# Patient Record
Sex: FEMALE | Race: WHITE | NOT HISPANIC OR LATINO | Employment: OTHER | URBAN - METROPOLITAN AREA
[De-identification: names, ages, dates, MRNs, and addresses within clinical notes are randomized per-mention and may not be internally consistent; named-entity substitution may affect disease eponyms.]

---

## 2022-04-25 LAB — BCS RECOMMENDATION EXT: NORMAL

## 2023-05-26 LAB
CHOLEST SERPL-MSCNC: 220 MG/DL (ref 0–200)
HDLC SERPL-MCNC: 48 MG/DL (ref 35–70)
LDLC SERPL CALC-MCNC: 137 MG/DL
LIPIDS, HDL CHOLESTEROL: 48
LIPIDS, LDL CHOLESTEROL: 137
LIPIDS, TOTAL CHOLESTEROL: 220
LIPIDS, TRIGLYCERIDES: 193
TRIGL SERPL-MCNC: 193 MG/DL (ref 40–160)

## 2023-06-03 PROBLEM — E04.1 NONTOXIC SINGLE THYROID NODULE: Status: ACTIVE | Noted: 2023-06-03

## 2023-06-03 PROBLEM — E66.9 OBESE: Status: ACTIVE | Noted: 2023-06-03

## 2023-06-03 PROBLEM — D69.6 THROMBOCYTOPENIA, UNSPECIFIED: Status: ACTIVE | Noted: 2023-06-03

## 2023-06-03 PROBLEM — I25.10 CORONARY ARTERY DISEASE: Status: ACTIVE | Noted: 2023-06-03

## 2023-06-03 PROBLEM — D75.1 POLYCYTHEMIA: Status: ACTIVE | Noted: 2023-06-03

## 2023-06-03 PROBLEM — F32.A DEPRESSION: Status: ACTIVE | Noted: 2023-06-03

## 2023-06-03 PROBLEM — D64.9 ANEMIA, UNSPECIFIED: Status: ACTIVE | Noted: 2023-06-03

## 2023-06-03 PROBLEM — I77.9 CAROTID ARTERIAL DISEASE: Status: ACTIVE | Noted: 2023-06-03

## 2023-06-03 PROBLEM — M15.0 PRIMARY GENERALIZED (OSTEO)ARTHRITIS: Status: ACTIVE | Noted: 2023-06-03

## 2023-06-03 PROBLEM — Z87.19 HISTORY OF RECTAL POLYPS: Status: ACTIVE | Noted: 2023-06-03

## 2023-06-03 PROBLEM — D72.819 LEUKOPENIA: Status: ACTIVE | Noted: 2023-06-03

## 2023-06-03 PROBLEM — E04.2 MULTIPLE THYROID NODULES: Status: ACTIVE | Noted: 2023-06-03

## 2023-06-03 PROBLEM — I10 ESSENTIAL HYPERTENSION, BENIGN: Status: ACTIVE | Noted: 2023-06-03

## 2023-06-03 PROBLEM — E55.9 VITAMIN D DEFICIENCY: Status: ACTIVE | Noted: 2023-06-03

## 2023-06-03 PROBLEM — E78.5 HYPERLIPIDEMIA: Status: ACTIVE | Noted: 2023-06-03

## 2023-06-03 PROBLEM — F41.9 ANXIETY DISORDER, UNSPECIFIED: Status: ACTIVE | Noted: 2023-06-03

## 2023-06-28 ENCOUNTER — DOCUMENTATION ONLY (OUTPATIENT)
Dept: FAMILY MEDICINE | Facility: CLINIC | Age: 83
End: 2023-06-28
Payer: MEDICARE

## 2023-06-28 LAB — CRC RECOMMENDATION EXT: NORMAL

## 2023-06-28 RX ORDER — FLUOXETINE HYDROCHLORIDE 40 MG/1
40 CAPSULE ORAL DAILY
COMMUNITY
End: 2023-07-20 | Stop reason: SDUPTHER

## 2023-06-28 RX ORDER — PRAVASTATIN SODIUM 40 MG/1
40 TABLET ORAL DAILY
COMMUNITY
End: 2024-01-30 | Stop reason: SDUPTHER

## 2023-06-28 RX ORDER — NAPROXEN SODIUM 220 MG
220 TABLET ORAL 2 TIMES DAILY WITH MEALS
COMMUNITY

## 2023-06-28 RX ORDER — ASPIRIN 81 MG/1
81 TABLET ORAL DAILY
COMMUNITY

## 2023-06-28 RX ORDER — CALCIUM CARBONATE 500(1250)
1 TABLET,CHEWABLE ORAL 2 TIMES DAILY
COMMUNITY

## 2023-06-28 RX ORDER — HYDROCHLOROTHIAZIDE 12.5 MG/1
12.5 TABLET ORAL DAILY
COMMUNITY
End: 2023-07-03 | Stop reason: SDUPTHER

## 2023-06-28 RX ORDER — AMLODIPINE BESYLATE 10 MG/1
10 TABLET ORAL DAILY
COMMUNITY

## 2023-06-28 RX ORDER — CHOLECALCIFEROL (VITAMIN D3) 25 MCG
5000 TABLET ORAL DAILY
COMMUNITY

## 2023-06-28 RX ORDER — BENAZEPRIL HYDROCHLORIDE 10 MG/1
10 TABLET ORAL 2 TIMES DAILY
COMMUNITY

## 2023-07-03 ENCOUNTER — DOCUMENTATION ONLY (OUTPATIENT)
Dept: PRIMARY CARE CLINIC | Facility: CLINIC | Age: 83
End: 2023-07-03
Payer: MEDICARE

## 2023-07-03 ENCOUNTER — OFFICE VISIT (OUTPATIENT)
Dept: FAMILY MEDICINE | Facility: CLINIC | Age: 83
End: 2023-07-03
Payer: MEDICARE

## 2023-07-03 VITALS
OXYGEN SATURATION: 91 % | HEART RATE: 69 BPM | SYSTOLIC BLOOD PRESSURE: 130 MMHG | DIASTOLIC BLOOD PRESSURE: 78 MMHG | WEIGHT: 210.81 LBS | TEMPERATURE: 97 F | BODY MASS INDEX: 41.39 KG/M2 | HEIGHT: 60 IN

## 2023-07-03 DIAGNOSIS — E78.2 MIXED HYPERLIPIDEMIA: ICD-10-CM

## 2023-07-03 DIAGNOSIS — F17.200 SMOKER: ICD-10-CM

## 2023-07-03 DIAGNOSIS — Z00.00 ANNUAL PHYSICAL EXAM: ICD-10-CM

## 2023-07-03 DIAGNOSIS — D75.1 POLYCYTHEMIA: ICD-10-CM

## 2023-07-03 DIAGNOSIS — E66.9 OBESITY, UNSPECIFIED CLASSIFICATION, UNSPECIFIED OBESITY TYPE, UNSPECIFIED WHETHER SERIOUS COMORBIDITY PRESENT: ICD-10-CM

## 2023-07-03 DIAGNOSIS — R73.9 HYPERGLYCEMIA: ICD-10-CM

## 2023-07-03 DIAGNOSIS — I10 ESSENTIAL HYPERTENSION, BENIGN: Primary | ICD-10-CM

## 2023-07-03 PROCEDURE — 99214 PR OFFICE/OUTPT VISIT, EST, LEVL IV, 30-39 MIN: ICD-10-PCS | Mod: ,,, | Performed by: FAMILY MEDICINE

## 2023-07-03 PROCEDURE — 99214 OFFICE O/P EST MOD 30 MIN: CPT | Mod: ,,, | Performed by: FAMILY MEDICINE

## 2023-07-03 RX ORDER — HYDROCHLOROTHIAZIDE 12.5 MG/1
1 TABLET ORAL EVERY MORNING
COMMUNITY

## 2023-07-03 NOTE — PROGRESS NOTES
Patient Name: Tammy Arguelles     Patient ID: [id number]     Chief Complaint: Results (Patient here for lab results.)      HPI:     Tammy Arguelles is a 82 y.o. female here today for lab work results. Reviewed and discussed lab work results.    Past Medical History:   Diagnosis Date    Carotid arterial disease     Depression     Essential hypertension, benign     History of rectal polyps     Hyperlipidemia     Hyperuricemia     Nontoxic single thyroid nodule     Obese     Polycythemia     Primary generalized (osteo)arthritis     Vitamin D deficiency         Past Surgical History:   Procedure Laterality Date    ANGIOGRAM, CORONARY, WITH LEFT HEART CATHETERIZATION      CHOLECYSTECTOMY      HYSTERECTOMY      PARTIAL KNEE REPLACE Bilateral     R  ELBOW SURGERY      ROTATOR CUFF REPAIR Right     THYROID NODULE BIOP      THYROID SURGERY Left 2023    Nodule removed        Social History     Socioeconomic History    Marital status:     Number of children: 3   Tobacco Use    Smoking status: Every Day     Packs/day: 0.25     Years: 15.00     Pack years: 3.75     Types: Cigarettes    Smokeless tobacco: Never   Substance and Sexual Activity    Alcohol use: Yes     Comment: very seldom    Drug use: Never        Current Outpatient Medications   Medication Instructions    amLODIPine (NORVASC) 10 mg, Oral, Daily    aspirin (ECOTRIN) 81 mg, Oral, Daily    benazepriL (LOTENSIN) 10 mg, Oral, 2 times daily    calcium carbonate (OS-DARRIN) 500 mg calcium (1,250 mg) chewable tablet 1 tablet, Oral, 2 times daily    FLUoxetine 40 mg, Oral, Daily    hydroCHLOROthiazide (HYDRODIURIL) 12.5 MG Tab 1 tablet, Every morning    naproxen sodium (ANAPROX) 220 mg, Oral, 2 times daily with meals    pravastatin (PRAVACHOL) 40 mg, Oral, Daily    vitamin D (VITAMIN D3) 5,000 Units, Oral, Daily       Review of patient's allergies indicates:  No Known Allergies       There is no immunization history on file for this patient.    Patient Care  Team:  Sebastien Rothman Sr., MD as PCP - General (Family Medicine)  Son DEBRA MD Dori as Consulting Physician (Gastroenterology)  Vernon A. Valentino, MD as Consulting Physician (Cardiology)  Olu Kuhn MD as Consulting Physician (Otolaryngology)     Subjective:     Review of Systems    10 point review of systems conducted, negative except as stated in the history of present illness. See HPI for details.    Objective:     Visit Vitals  /78 (BP Location: Left arm, Patient Position: Sitting, BP Method: Large (Manual))   Pulse 69   Temp 97.2 °F (36.2 °C)   Ht 5' (1.524 m)   Wt 95.6 kg (210 lb 12.8 oz)   SpO2 (!) 91%   BMI 41.17 kg/m²       Physical Exam  Constitutional:       Appearance: She is obese.   HENT:      Head: Normocephalic and atraumatic.   Cardiovascular:      Rate and Rhythm: Normal rate and regular rhythm.      Heart sounds: Normal heart sounds.   Pulmonary:      Effort: Pulmonary effort is normal.      Breath sounds: Normal breath sounds.   Abdominal:      General: Abdomen is flat.      Palpations: Abdomen is soft.      Tenderness: There is no abdominal tenderness.   Musculoskeletal:         General: No swelling or tenderness. Normal range of motion.      Cervical back: Normal range of motion and neck supple.      Right lower leg: No edema.      Left lower leg: No edema.   Skin:     General: Skin is warm and dry.   Neurological:      General: No focal deficit present.      Mental Status: She is alert and oriented to person, place, and time.   Psychiatric:         Mood and Affect: Mood normal.         Assessment:       ICD-10-CM ICD-9-CM   1. Essential hypertension, benign  I10 401.1   2. Mixed hyperlipidemia  E78.2 272.2   3. Polycythemia  D75.1 238.4   4. Obesity, unspecified classification, unspecified obesity type, unspecified whether serious comorbidity present  E66.9 278.00   5. Smoker  F17.200 305.1   6. Hyperglycemia  R73.9 790.29   7. Annual physical exam  Z00.00 V70.0         Plan:     1. Essential hypertension, benign  Overview:  Continue with Norvasc 10 mg daily + Lotensin 10 mg daily HCTZ 12.5 mg daily.  Patient is well controlled.  Low Sodium Diet (DASH Diet - Less than 2 grams of sodium per day).  Monitor blood pressure daily and log. Report consistent numbers greater than 140/90.  Maintain healthy weight with goal BMI <30. Exercise 30 minutes per day, 5 days per week.      Orders:  -     CBC Auto Differential; Future; Expected date: 11/03/2023  -     POCT Microalbumin/Creatinine Ratio; Future; Expected date: 11/03/2023    2. Mixed hyperlipidemia  Overview:  Most recent  mg/dL on 5/26/23.  Continue with Pravachol 40 mg daily.  Stressed importance of dietary modifications. Follow a low cholesterol, low saturated fat diet with less that 200mg of cholesterol a day.  Avoid fried foods and high saturated fats (high saturated fats less than 7% of calories).  Add Flax Seed/Fish Oil supplements to diet. Increase dietary fiber.  Regular exercise can reduce LDL and raise HDL. Stressed importance of physical activity 5 times per week for 30 minutes per day.       Assessment & Plan:  Instructed patient to take Pravachol 40 mg every day as prescribed.  Educated on low cholesterol diet.      3. Polycythemia  Overview:  Continue with Aspirin 81 mg daily.  Polycythemia possibly secondary to smoking.  Increase daily water intake.  Will continue to monitor        4. Obesity, unspecified classification, unspecified obesity type, unspecified whether serious comorbidity present  Overview:  Body mass index is 41.17 kg/m².  Goal BMI <30.  Exercise 5 times a week for 30 minutes per day.  Avoid soda, simple sugars, excessive rice, potatoes or bread. Limit fast foods and fried foods.  Choose complex carbs in moderation (example: green vegetables, beans, oatmeal). Eat plenty of fresh fruits and vegetables with lean meats daily.  Do not skip meals. Eat a balanced portion size.  Avoid fad diets.  Consider permanent healthy life style changes.       5. Smoker  Overview:  Patient was offered smoking cessation techniques such as nicotine gum or patches.  They were also offered a more regimented smoking cessation program.  They were advised to decrease the number of cigarettes by 1 every few days until they completely stopped.  It was strongly recommended that they set a quit date and stick to it.  And finally, important health reasons to stop smoking were given to the patient. Approximately    minutes was spent discussing smoking cessation.      6. Hyperglycemia  Overview:  Follow ADA Diet. Avoid soda, simple sweets, and limit rice/pasta/breads/starches (no more than 45-50 grams per meal).  Maintain healthy weight with goal BMI <30.  Exercise 5 times per week for 30 minutes per day.    Orders:  -     Hemoglobin A1C; Future; Expected date: 07/10/2023    7. Annual physical exam  -     CBC Auto Differential; Future; Expected date: 11/03/2023  -     Comprehensive Metabolic Panel; Future; Expected date: 11/03/2023  -     Lipid Panel; Future; Expected date: 11/03/2023  -     TSH; Future; Expected date: 11/03/2023  -     CK; Future; Expected date: 11/03/2023  -     POCT Urinalysis, Dipstick, Automated, W/O Scope; Future; Expected date: 11/03/2023  -     POCT Microalbumin/Creatinine Ratio; Future; Expected date: 11/03/2023  -     EKG 12-lead; Future; Expected date: 11/03/2023        [x] Discussed lab findings with the patient.  [x] Discussed diet, exercise and if appropriate, weight loss.  [x] Instructions and information, including risks and benefits of prescribed medication(s) have been reviewed with the patient and patient verbalizes understanding. Questions have been answered to the patient's satisfaction.  [] Appropriate counseling has been given regarding anxiety and depressive issues that were discussed today.  [] Any lab drawn today will be reviewed by physician at the time it is received and appropriate  recommendations bill be made and discussed with patient.     Follow up in about 4 months (around 11/3/2023) for Annual Wellness, With Fasting Labs prior to visit.   In addition to their scheduled follow up, the patient has also been instructed to follow up on as needed basis.     Future Appointments   Date Time Provider Department Center   7/10/2023  8:20 AM NURSE, [facility] STANISLAV Rothman Sr, MD

## 2023-07-03 NOTE — ASSESSMENT & PLAN NOTE
Instructed patient to take Pravachol 40 mg every day as prescribed.  Educated on low cholesterol diet.

## 2023-07-07 ENCOUNTER — DOCUMENTATION ONLY (OUTPATIENT)
Dept: FAMILY MEDICINE | Facility: CLINIC | Age: 83
End: 2023-07-07
Payer: MEDICARE

## 2023-07-07 NOTE — PROGRESS NOTES
Pt instructed to RTC for Lab draw. Verbal orders per MD.   She will call to confirm availability with front office staff.

## 2023-07-12 PROCEDURE — 83036 HEMOGLOBIN GLYCOSYLATED A1C: CPT | Performed by: OTOLARYNGOLOGY

## 2023-07-12 PROCEDURE — 84443 ASSAY THYROID STIM HORMONE: CPT | Performed by: OTOLARYNGOLOGY

## 2023-07-20 ENCOUNTER — DOCUMENTATION ONLY (OUTPATIENT)
Dept: FAMILY MEDICINE | Facility: CLINIC | Age: 83
End: 2023-07-20
Payer: MEDICARE

## 2023-07-20 DIAGNOSIS — F32.A DEPRESSION, UNSPECIFIED DEPRESSION TYPE: Primary | ICD-10-CM

## 2023-07-20 RX ORDER — FLUOXETINE HYDROCHLORIDE 40 MG/1
40 CAPSULE ORAL DAILY
Qty: 90 CAPSULE | Refills: 0 | Status: SHIPPED | OUTPATIENT
Start: 2023-07-20 | End: 2023-11-28 | Stop reason: SDUPTHER

## 2023-11-28 DIAGNOSIS — F32.A DEPRESSION, UNSPECIFIED DEPRESSION TYPE: ICD-10-CM

## 2023-11-28 RX ORDER — FLUOXETINE HYDROCHLORIDE 40 MG/1
40 CAPSULE ORAL DAILY
Qty: 90 CAPSULE | Refills: 1 | Status: SHIPPED | OUTPATIENT
Start: 2023-11-28

## 2024-01-18 LAB
CHOLEST SERPL-MSCNC: 194 MG/DL (ref 0–200)
HBA1C MFR BLD: 5.8 % (ref 4–6)
HDLC SERPL-MCNC: 54 MG/DL (ref 35–70)
LDLC SERPL CALC-MCNC: 116 MG/DL
TRIGL SERPL-MCNC: 118 MG/DL (ref 40–160)

## 2024-01-30 DIAGNOSIS — E78.2 MIXED HYPERLIPIDEMIA: Primary | ICD-10-CM

## 2024-01-30 DIAGNOSIS — E78.2 MIXED HYPERLIPIDEMIA: ICD-10-CM

## 2024-01-30 RX ORDER — PRAVASTATIN SODIUM 40 MG/1
40 TABLET ORAL NIGHTLY
Qty: 90 TABLET | Refills: 0 | Status: SHIPPED | OUTPATIENT
Start: 2024-01-30 | End: 2024-01-30 | Stop reason: SDUPTHER

## 2024-01-30 RX ORDER — PRAVASTATIN SODIUM 40 MG/1
40 TABLET ORAL NIGHTLY
Qty: 90 TABLET | Refills: 0 | Status: SHIPPED | OUTPATIENT
Start: 2024-01-30 | End: 2024-02-09

## 2024-02-08 PROBLEM — I77.9 CAROTID ARTERIAL DISEASE: Status: RESOLVED | Noted: 2023-06-03 | Resolved: 2024-02-08

## 2024-02-08 PROBLEM — Z87.19 HISTORY OF RECTAL POLYPS: Status: RESOLVED | Noted: 2023-06-03 | Resolved: 2024-02-08

## 2024-02-08 NOTE — ASSESSMENT & PLAN NOTE
Total Cholestrol 194 mg/dl ;[id number] mg/dl; HDL 54mg/dl;  mg/dl.  Patient's cholesterol is not well controlled.  We will DC Pravachol and start on Crestor 20 mg daily.  We will continue to monitor.

## 2024-02-09 ENCOUNTER — OFFICE VISIT (OUTPATIENT)
Dept: FAMILY MEDICINE | Facility: CLINIC | Age: 84
End: 2024-02-09
Payer: MEDICARE

## 2024-02-09 VITALS — BODY MASS INDEX: 41.43 KG/M2 | HEIGHT: 60 IN | WEIGHT: 211 LBS

## 2024-02-09 DIAGNOSIS — G47.8 NON-RESTORATIVE SLEEP: ICD-10-CM

## 2024-02-09 DIAGNOSIS — R06.83 SNORING: ICD-10-CM

## 2024-02-09 DIAGNOSIS — I10 ESSENTIAL HYPERTENSION, BENIGN: Primary | ICD-10-CM

## 2024-02-09 DIAGNOSIS — Z12.31 SCREENING MAMMOGRAM FOR BREAST CANCER: ICD-10-CM

## 2024-02-09 DIAGNOSIS — F17.200 SMOKER: ICD-10-CM

## 2024-02-09 DIAGNOSIS — R73.03 PREDIABETES: ICD-10-CM

## 2024-02-09 DIAGNOSIS — E78.2 MIXED HYPERLIPIDEMIA: ICD-10-CM

## 2024-02-09 DIAGNOSIS — E66.01 CLASS 3 SEVERE OBESITY DUE TO EXCESS CALORIES WITH SERIOUS COMORBIDITY AND BODY MASS INDEX (BMI) OF 40.0 TO 44.9 IN ADULT: ICD-10-CM

## 2024-02-09 DIAGNOSIS — I38 VALVULAR HEART DISEASE: ICD-10-CM

## 2024-02-09 PROBLEM — E66.813 CLASS 3 SEVERE OBESITY DUE TO EXCESS CALORIES WITH BODY MASS INDEX (BMI) OF 40.0 TO 44.9 IN ADULT: Status: ACTIVE | Noted: 2023-06-03

## 2024-02-09 PROCEDURE — 99214 OFFICE O/P EST MOD 30 MIN: CPT | Mod: ,,, | Performed by: FAMILY MEDICINE

## 2024-02-09 RX ORDER — ROSUVASTATIN CALCIUM 20 MG/1
20 TABLET, COATED ORAL DAILY
Qty: 90 TABLET | Refills: 3 | Status: SHIPPED | OUTPATIENT
Start: 2024-02-09 | End: 2025-02-08

## 2024-02-09 NOTE — PROGRESS NOTES
Patient Name: Tammy Arguelles     Patient ID: [id number]     Chief Complaint: Results (Here for lab results.)      HPI:     Tammy Arguelles is a 83 y.o. female here today for follow-up of hypertension, hyperlipidemia and prediabetes.    Past Medical History:   Diagnosis Date    Aortic stenosis, severe 02/06/2024    Dr Valentino    Carotid arterial disease     Depression     Essential hypertension, benign     History of rectal polyps     Hyperlipidemia     Hyperuricemia     Nontoxic single thyroid nodule     Obese     Polycythemia     Primary generalized (osteo)arthritis     Vitamin D deficiency         Past Surgical History:   Procedure Laterality Date    ANGIOGRAM, CORONARY, WITH LEFT HEART CATHETERIZATION      CHOLECYSTECTOMY      COLONOSCOPY  06/28/2023    No further screenings    HYSTERECTOMY      PARTIAL KNEE REPLACE Bilateral     R  ELBOW SURGERY      ROTATOR CUFF REPAIR Right     THYROID NODULE BIOP      THYROID SURGERY Left 2023    Nodule removed        Social History     Socioeconomic History    Marital status:     Number of children: 3   Tobacco Use    Smoking status: Every Day     Current packs/day: 0.25     Average packs/day: 0.3 packs/day for 15.0 years (3.8 ttl pk-yrs)     Types: Cigarettes    Smokeless tobacco: Never   Substance and Sexual Activity    Alcohol use: Yes     Comment: very seldom    Drug use: Never        Current Outpatient Medications   Medication Instructions    amLODIPine (NORVASC) 10 mg, Oral, Daily    aspirin (ECOTRIN) 81 mg, Oral, Daily    benazepriL (LOTENSIN) 10 mg, Oral, 2 times daily    calcium carbonate (OS-DARRIN) 500 mg calcium (1,250 mg) chewable tablet 1 tablet, Oral, 2 times daily    FLUoxetine 40 mg, Oral, Daily    hydroCHLOROthiazide (HYDRODIURIL) 12.5 MG Tab 1 tablet, Every morning    naproxen sodium (ANAPROX) 220 mg, Oral, 2 times daily with meals    vitamin D (VITAMIN D3) 5,000 Units, Oral, Daily       Review of patient's allergies indicates:   Allergen  Reactions    Adhesive         Immunization History   Administered Date(s) Administered    COVID-19, MRNA, LN-S, PF (Pfizer) (Purple Cap) 08/26/2021, 09/16/2021    Zoster Recombinant 03/15/2022, 05/31/2022       Patient Care Team:  Sebastien Rothman Sr., MD as PCP - General (Family Medicine)  Jesus Meadows MD as Consulting Physician (Gastroenterology)  Valentino, Vernon A., MD as Consulting Physician (Cardiology)  Olu Kuhn MD as Consulting Physician (Otolaryngology)     Subjective:     Review of Systems    10 point review of systems conducted, negative except as stated in the history of present illness. See HPI for details.    Objective:     Visit Vitals  Ht 5' (1.524 m)   Wt 95.7 kg (211 lb)   BMI 41.21 kg/m²       Physical Exam  Constitutional:       Appearance: She is obese.   HENT:      Head: Normocephalic and atraumatic.   Cardiovascular:      Rate and Rhythm: Normal rate and regular rhythm.   Pulmonary:      Effort: Pulmonary effort is normal.      Breath sounds: Normal breath sounds.   Abdominal:      Palpations: Abdomen is soft.      Tenderness: There is no abdominal tenderness.   Musculoskeletal:         General: No swelling or tenderness. Normal range of motion.      Cervical back: Normal range of motion and neck supple.      Right lower leg: No edema.      Left lower leg: No edema.   Skin:     General: Skin is warm and dry.   Neurological:      General: No focal deficit present.      Mental Status: She is alert and oriented to person, place, and time.   Psychiatric:         Mood and Affect: Mood normal.       Assessment:       ICD-10-CM ICD-9-CM   1. Essential hypertension, benign  I10 401.1   2. Mixed hyperlipidemia  E78.2 272.2   3. Prediabetes  R73.03 790.29   4. Valvular heart disease  I38 424.90   5. Class 3 severe obesity due to excess calories with serious comorbidity and body mass index (BMI) of 40.0 to 44.9 in adult  E66.01 278.01    Z68.41 V85.41   6. Smoker  F17.200 305.1        Plan:   1. Essential hypertension, benign  Overview:  Hypertension Medications               amLODIPine (NORVASC) 10 MG tablet Take 10 mg by mouth once daily.    benazepriL (LOTENSIN) 10 MG tablet Take 10 mg by mouth 2 (two) times a day.    hydroCHLOROthiazide (HYDRODIURIL) 12.5 MG Tab 1 tablet once daily.        Low Sodium Diet (DASH Diet - Less than 2 grams of sodium per day).  Monitor blood pressure daily and log. Report consistent numbers greater than 140/90.  Maintain healthy weight with goal BMI <30. Exercise 30 minutes per day, 5 days per week.      Assessment & Plan:  Blood Pressure at goal and well controlled . Pt. to continue on current medications and management of hypertension with Dr. Valentino.      2. Mixed hyperlipidemia  Overview:  Hyperlipidemia Medications               pravastatin (PRAVACHOL) 40 MG tablet Take 1 tablet (40 mg total) by mouth every evening.        Stressed importance of dietary modifications. Follow a low cholesterol, low saturated fat diet with less that 200mg of cholesterol a day.  Avoid fried foods and high saturated fats (high saturated fats less than 7% of calories).  Add Flax Seed/Fish Oil supplements to diet. Increase dietary fiber.  Regular exercise can reduce LDL and raise HDL. Stressed importance of physical activity 5 times per week for 30 minutes per day.       Assessment & Plan:  Total Cholestrol 194 mg/dl ;Fozejixxdhsgi537 mg/dl; HDL 54mg/dl;  mg/dl.  Patient's cholesterol is not well controlled.  We will DC Pravachol and start on Crestor 20 mg daily.  We will continue to monitor.      3. Prediabetes  Overview:  Patient is on no meds for her prediabetes   Follow ADA Diet. Avoid soda, simple sweets, and limit rice/pasta/breads/starches (no more than 45-50 grams per meal).  Maintain healthy weight with goal BMI <30.  Exercise 5 times per week for 30 minutes per day.    Assessment & Plan:  Patient's A1c on 01/18/2024 was 5.8.  Her prediabetes is well  controlled with diet. We will continue to monitor her A1c levels.      4. Valvular heart disease  Overview:  Patient has aortic and mitral valve stenosis.  She is being seen and managed by Cardiology.    Assessment & Plan:  Continued cardiology follow-up was recommended.      5. Class 3 severe obesity due to excess calories with serious comorbidity and body mass index (BMI) of 40.0 to 44.9 in adult  Overview:  Body mass index is 41.17 kg/m².  Goal BMI <30.  Exercise 5 times a week for 30 minutes per day.  Avoid soda, simple sugars, excessive rice, potatoes or bread. Limit fast foods and fried foods.  Choose complex carbs in moderation (example: green vegetables, beans, oatmeal). Eat plenty of fresh fruits and vegetables with lean meats daily.  Do not skip meals. Eat a balanced portion size.  Avoid fad diets. Consider permanent healthy life style changes.     Assessment & Plan:  Patient is obese, she snores, has daytime fatigue and non restorative sleep.  We will set up for a sleep study.      6. Smoker  Comments:  We will send patient for a CT lung cancer screen.  Overview:  Patient was offered smoking cessation techniques such as nicotine gum or patches.  They were also offered a more regimented smoking cessation program.  They were advised to decrease the number of cigarettes by 1 every few days until they completely stopped.  It was strongly recommended that they set a quit date and stick to it.  And finally, important health reasons to stop smoking were given to the patient. Approximately 10 minutes was spent discussing smoking cessation.    Assessment & Plan:  Pt is down to 3 cigarettes/ day.            [x] Discussed lab findings with the patient.  [x] Discussed diet, exercise and if appropriate, weight loss.  [x] Instructions and information, including risks and benefits of prescribed medication(s) have been reviewed with the patient and patient verbalizes understanding. Questions have been answered to the  patient's satisfaction.  [] Appropriate counseling has been given regarding anxiety and depressive issues that were discussed today.  [] Any lab drawn today will be reviewed by physician at the time it is received and appropriate recommendations bill be made and discussed with patient.     No follow-ups on file.   In addition to their scheduled follow up, the patient has also been instructed to follow up on as needed basis.     Future Appointments   Date Time Provider Department Center   5/7/2024  7[facility]   5/21/2024  8:30 AM Sebastien Rothman Sr., MD [facility] Rothman Sr, MD

## 2024-02-09 NOTE — ASSESSMENT & PLAN NOTE
Blood Pressure at goal and well controlled . Pt. to continue on current medications and management of hypertension with Dr. Valentino.

## 2024-02-09 NOTE — ASSESSMENT & PLAN NOTE
Patient is obese, she snores, has daytime fatigue and non restorative sleep.  We will set up for a sleep study.

## 2024-02-09 NOTE — ASSESSMENT & PLAN NOTE
Patient's A1c on 01/18/2024 was 5.8.  Her prediabetes is well controlled with diet. We will continue to monitor her A1c levels.

## 2024-02-12 ENCOUNTER — DOCUMENTATION ONLY (OUTPATIENT)
Dept: FAMILY MEDICINE | Facility: CLINIC | Age: 84
End: 2024-02-12
Payer: MEDICARE

## 2024-02-21 ENCOUNTER — TELEPHONE (OUTPATIENT)
Dept: FAMILY MEDICINE | Facility: CLINIC | Age: 84
End: 2024-02-21
Payer: MEDICARE

## 2024-02-22 NOTE — TELEPHONE ENCOUNTER
Ms. Munoz called to let you know the surgeon does not think she is a surgical candidate. Dr. Valentino will treat her with diuretics.

## 2024-02-22 NOTE — TELEPHONE ENCOUNTER
----- Message from Clari Jones sent at 2/21/2024  8:25 AM CST -----  Regarding: AYAN SUTHERLAND SAW THE SURGEON AND SHE WANTS TO LET YOU KNOW WHAT HE SAID

## 2024-05-07 PROCEDURE — 83036 HEMOGLOBIN GLYCOSYLATED A1C: CPT | Performed by: FAMILY MEDICINE

## 2024-05-07 PROCEDURE — 82306 VITAMIN D 25 HYDROXY: CPT | Performed by: FAMILY MEDICINE

## 2024-05-07 PROCEDURE — 80061 LIPID PANEL: CPT | Performed by: FAMILY MEDICINE

## 2024-05-07 PROCEDURE — 84443 ASSAY THYROID STIM HORMONE: CPT | Performed by: FAMILY MEDICINE

## 2024-05-07 PROCEDURE — 84550 ASSAY OF BLOOD/URIC ACID: CPT | Performed by: FAMILY MEDICINE

## 2024-05-07 PROCEDURE — 80053 COMPREHEN METABOLIC PANEL: CPT | Performed by: FAMILY MEDICINE

## 2024-05-07 PROCEDURE — 85025 COMPLETE CBC W/AUTO DIFF WBC: CPT | Performed by: FAMILY MEDICINE

## 2024-05-21 ENCOUNTER — OFFICE VISIT (OUTPATIENT)
Dept: FAMILY MEDICINE | Facility: CLINIC | Age: 84
End: 2024-05-21
Payer: MEDICARE

## 2024-05-21 VITALS
BODY MASS INDEX: 41.35 KG/M2 | DIASTOLIC BLOOD PRESSURE: 78 MMHG | HEART RATE: 90 BPM | WEIGHT: 210.63 LBS | TEMPERATURE: 97 F | HEIGHT: 60 IN | OXYGEN SATURATION: 92 % | SYSTOLIC BLOOD PRESSURE: 128 MMHG | RESPIRATION RATE: 22 BRPM

## 2024-05-21 DIAGNOSIS — I38 VALVULAR HEART DISEASE: Primary | ICD-10-CM

## 2024-05-21 DIAGNOSIS — I10 ESSENTIAL HYPERTENSION, BENIGN: ICD-10-CM

## 2024-05-21 DIAGNOSIS — E78.2 MIXED HYPERLIPIDEMIA: ICD-10-CM

## 2024-05-21 PROCEDURE — 99214 OFFICE O/P EST MOD 30 MIN: CPT | Mod: ,,, | Performed by: NURSE PRACTITIONER

## 2024-05-21 RX ORDER — FUROSEMIDE 20 MG/1
20 TABLET ORAL DAILY
COMMUNITY
Start: 2024-02-21

## 2024-05-21 NOTE — PROGRESS NOTES
Internal Medicine    Patient ID: [id number]     Chief Complaint: Results ((Patient here for lab results).)    HPI:     Tammy Arguelles is a 83 y.o. female here today for a follow up. Reviewed and discussed lab results. Saw Cardiology recently and was not deemed a surgical candidate for aortic valve replacement. Reports some decline - having mild CHAMBERLAIN, edema and SOB.  We discussed at length the importance of an advanced directive and appointing medical POA. Her son was a medic and she feels he may not honor her wishes of DNR. We discussed home health vs hospice if she continues to decline - wishes to hold off for now. No other complaints today.     Past Medical History:   Diagnosis Date    Aortic stenosis, severe 02/06/2024    Dr Valentino    Carotid arterial disease     Depression     Essential hypertension, benign     History of rectal polyps     Hyperlipidemia     Hyperuricemia     Nontoxic single thyroid nodule     Obese     Polycythemia     Primary generalized (osteo)arthritis     Vitamin D deficiency         Past Surgical History:   Procedure Laterality Date    ANGIOGRAM, CORONARY, WITH LEFT HEART CATHETERIZATION      CHOLECYSTECTOMY      COLONOSCOPY  06/28/2023    No further screenings    HYSTERECTOMY      PARTIAL KNEE REPLACE Bilateral     R  ELBOW SURGERY      ROTATOR CUFF REPAIR Right     THYROID NODULE BIOP      THYROID SURGERY Left 2023    Nodule removed        Social History     Tobacco Use    Smoking status: Every Day     Current packs/day: 0.25     Average packs/day: 0.3 packs/day for 15.0 years (3.8 ttl pk-yrs)     Types: Cigarettes    Smokeless tobacco: Never   Substance and Sexual Activity    Alcohol use: Yes     Comment: very seldom    Drug use: Never    Sexual activity: Not on file        Current Outpatient Medications   Medication Instructions    amLODIPine (NORVASC) 10 mg, Oral, Daily    aspirin (ECOTRIN) 81 mg, Oral, Daily    benazepriL (LOTENSIN) 10 mg, Oral, 2 times daily    calcium carbonate  (OS-DARRIN) 500 mg calcium (1,250 mg) chewable tablet 1 tablet, Oral, 2 times daily    FLUoxetine 40 mg, Oral, Daily    furosemide (LASIX) 20 mg, Oral, Daily    naproxen sodium (ANAPROX) 220 mg, Oral, 2 times daily with meals    rosuvastatin (CRESTOR) 20 mg, Oral, Daily    vitamin D (VITAMIN D3) 5,000 Units, Oral, Daily       Review of patient's allergies indicates:   Allergen Reactions    Adhesive         Patient Care Team:  Sebastien Rothman Sr., MD as PCP - General (Family Medicine)  Jesus Meadows MD as Consulting Physician (Gastroenterology)  Valentino, Vernon A., MD as Consulting Physician (Cardiology)  Olu Kuhn MD as Consulting Physician (Otolaryngology)     Subjective:     Review of Systems    12 point review of systems conducted, negative except as stated in the history of present illness. See HPI for details.    Objective:     Visit Vitals  /78   Pulse 90   Temp 97.4 °F (36.3 °C)   Resp (!) 22   Ht 5' (1.524 m)   Wt 95.5 kg (210 lb 9.6 oz)   SpO2 (!) 92%   BMI 41.13 kg/m²       Physical Exam  Vitals and nursing note reviewed.   Constitutional:       General: She is not in acute distress.     Appearance: She is obese. She is not ill-appearing.   HENT:      Head: Normocephalic and atraumatic.      Mouth/Throat:      Mouth: Mucous membranes are moist.      Pharynx: Oropharynx is clear.   Eyes:      General: No scleral icterus.     Extraocular Movements: Extraocular movements intact.      Conjunctiva/sclera: Conjunctivae normal.      Pupils: Pupils are equal, round, and reactive to light.   Neck:      Vascular: No carotid bruit.   Cardiovascular:      Rate and Rhythm: Normal rate and regular rhythm.      Heart sounds: Murmur heard.      No friction rub. No gallop.   Pulmonary:      Effort: Pulmonary effort is normal. No respiratory distress.      Breath sounds: Normal breath sounds. No wheezing, rhonchi or rales.   Abdominal:      General: Abdomen is flat. Bowel sounds are normal. There is  no distension.      Palpations: Abdomen is soft. There is no mass.      Tenderness: There is no abdominal tenderness.   Musculoskeletal:         General: Normal range of motion.      Cervical back: Normal range of motion and neck supple.      Right lower leg: Edema present.      Left lower leg: Edema present.   Skin:     General: Skin is warm and dry.   Neurological:      General: No focal deficit present.      Mental Status: She is alert.   Psychiatric:         Mood and Affect: Mood normal.         Labs Reviewed:     Chemistry:  Lab Results   Component Value Date     05/07/2024    K 4.0 05/07/2024    CHLORIDE 106 05/07/2024    BUN 15.3 05/07/2024    CREATININE 0.78 05/07/2024    EGFRNORACEVR >60 05/07/2024    GLUCOSE 95 05/07/2024    CALCIUM 9.6 05/07/2024    ALKPHOS 69 05/07/2024    LABPROT 6.9 05/07/2024    ALBUMIN 3.7 05/07/2024    BILIDIR 0.3 01/30/2021    IBILI 0.10 01/30/2021    AST 15 05/07/2024    ALT 10 05/07/2024    MG 2.30 01/30/2021    [id number] 67.0 05/07/2024    TSH 2.924 05/07/2024        Lab Results   Component Value Date    HGBA1C 5.6 05/07/2024        Hematology:  Lab Results   Component Value Date    WBC 8.03 05/07/2024    HGB 15.4 05/07/2024    HCT 49.3 (H) 05/07/2024     05/07/2024       Lipid Panel:  Lab Results   Component Value Date    CHOL 129 05/07/2024    HDL 52 05/07/2024    LDL 57.00 05/07/2024    TRIG 99 05/07/2024    TOTALCHOLEST 2 05/07/2024    LDLCALC 92 01/30/2024        Urine:  Lab Results   Component Value Date    APPEARANCEUA TURBID 01/25/2021    PROTEINUA 2+ (A) 01/25/2021    LEUKOCYTESUR Trace (A) 01/25/2021    RBCUA None Seen 01/25/2021    WBCUA 6 (H) 01/25/2021    BACTERIA 1+ (A) 01/25/2021    HYALINECASTS Occasional (A) 01/25/2021        Assessment:       ICD-10-CM ICD-9-CM   1. Valvular heart disease  I38 424.90   2. Essential hypertension, benign  I10 401.1   3. Mixed hyperlipidemia  E78.2 272.2        Plan:     1. Valvular heart  disease  Overview:  Patient has aortic and mitral valve stenosis.  She is being seen and managed by Cardiology - Dr. Valentino. Deemed nonsurgical candidate - by Dr. Wray.      2. Essential hypertension, benign  Overview:  Hypertension Medications               amLODIPine (NORVASC) 10 MG tablet Take 10 mg by mouth once daily.    benazepriL (LOTENSIN) 10 MG tablet Take 10 mg by mouth 2 (two) times a day.    hydroCHLOROthiazide (HYDRODIURIL) 12.5 MG Tab 1 tablet once daily.        Low Sodium Diet (DASH Diet - Less than 2 grams of sodium per day).  Monitor blood pressure daily and log. Report consistent numbers greater than 140/90.  Maintain healthy weight with goal BMI <30. Exercise 30 minutes per day, 5 days per week.        3. Mixed hyperlipidemia  Overview:  Hyperlipidemia Medications               pravastatin (PRAVACHOL) 40 MG tablet Take 1 tablet (40 mg total) by mouth every evening.        Stressed importance of dietary modifications. Follow a low cholesterol, low saturated fat diet with less that 200mg of cholesterol a day.  Avoid fried foods and high saturated fats (high saturated fats less than 7% of calories).  Add Flax Seed/Fish Oil supplements to diet. Increase dietary fiber.  Regular exercise can reduce LDL and raise HDL. Stressed importance of physical activity 5 times per week for 30 minutes per day.              Follow up in about 2 months (around 7/21/2024) for Routine Follow Up. In addition to their scheduled follow up, the patient has also been instructed to follow up on as needed basis.     Future Appointments   Date Time Provider Department Center   7/22/2024 11:00 AM Sebastien Rothman Sr., MD [facility]viere, FNP

## 2024-06-06 DIAGNOSIS — F32.A DEPRESSION, UNSPECIFIED DEPRESSION TYPE: ICD-10-CM

## 2024-06-06 RX ORDER — FLUOXETINE HYDROCHLORIDE 40 MG/1
40 CAPSULE ORAL DAILY
Qty: 90 CAPSULE | Refills: 1 | Status: SHIPPED | OUTPATIENT
Start: 2024-06-06

## 2024-06-07 ENCOUNTER — TELEPHONE (OUTPATIENT)
Dept: FAMILY MEDICINE | Facility: CLINIC | Age: 84
End: 2024-06-07
Payer: MEDICARE

## 2024-06-07 DIAGNOSIS — I10 ESSENTIAL HYPERTENSION, BENIGN: Primary | ICD-10-CM

## 2024-06-07 RX ORDER — BENAZEPRIL HYDROCHLORIDE 10 MG/1
10 TABLET ORAL 2 TIMES DAILY
Qty: 60 TABLET | Refills: 2 | Status: SHIPPED | OUTPATIENT
Start: 2024-06-07

## 2024-06-07 NOTE — TELEPHONE ENCOUNTER
----- Message from Venus Johnson sent at 6/7/2024  8:47 AM CDT -----  Needs Benazepril refilled for Brianna.

## 2024-07-15 DIAGNOSIS — I10 ESSENTIAL HYPERTENSION, BENIGN: ICD-10-CM

## 2024-07-15 RX ORDER — BENAZEPRIL HYDROCHLORIDE 10 MG/1
10 TABLET ORAL 2 TIMES DAILY
Qty: 180 TABLET | Refills: 1 | Status: SHIPPED | OUTPATIENT
Start: 2024-07-15

## 2024-07-18 NOTE — ASSESSMENT & PLAN NOTE
Discussed prognosis and progressive nature of the disease  Discussed DNR and advanced planning today  -patient has all the forms at home but has not filled them out yet  -patient wants to be DNR, but her son, an EMT, disagrees with her; she will have a talk with him when the time is right.  We discussed that this was her choice, not his  -she wants her son to make decisions for her should she become incapacitated, but again she has not filled out a healthcare power-of- (or an advance directive) yet; she believes he would try to do more for her then she would want so she will have a conversation with him to make sure they are on the same page before she signs the paperwork.  As it stands today, she does not want any artificial life saving or sustaining measures, and she does not want CPR.  She wants to be allowed to go peacefully when it is time.

## 2024-07-18 NOTE — PROGRESS NOTES
Family Medicine    Patient ID: [id number]     Chief Complaint: Follow-up (Patient here for a 2 month follow up visit.)      HPI:     Tammy Arguelles is a 83 y.o. female here today for a follow up.     Past Medical History:   Diagnosis Date    Aortic stenosis, severe 02/06/2024    Dr Valentino    Carotid arterial disease     Depression     Essential hypertension, benign     History of rectal polyps     Hyperlipidemia     Hyperuricemia     Nontoxic single thyroid nodule     Obese     Polycythemia     Primary generalized (osteo)arthritis     Vitamin D deficiency         Past Surgical History:   Procedure Laterality Date    ANGIOGRAM, CORONARY, WITH LEFT HEART CATHETERIZATION      CHOLECYSTECTOMY      COLONOSCOPY  06/28/2023    No further screenings    HYSTERECTOMY      PARTIAL KNEE REPLACE Bilateral     R  ELBOW SURGERY      ROTATOR CUFF REPAIR Right     THYROID NODULE BIOP      THYROID SURGERY Left 2023    Nodule removed        Social History     Tobacco Use    Smoking status: Every Day     Current packs/day: 0.25     Average packs/day: 0.3 packs/day for 15.0 years (3.8 ttl pk-yrs)     Types: Cigarettes    Smokeless tobacco: Never   Substance and Sexual Activity    Alcohol use: Yes     Comment: very seldom    Drug use: Never    Sexual activity: Not on file        Current Outpatient Medications   Medication Instructions    amLODIPine (NORVASC) 10 mg, Oral, Daily    aspirin (ECOTRIN) 81 mg, Oral, Daily    benazepriL (LOTENSIN) 10 mg, Oral, 2 times daily    calcium carbonate (OS-DARRIN) 500 mg calcium (1,250 mg) chewable tablet 1 tablet, Oral, 2 times daily    FLUoxetine 40 mg, Oral, Daily    furosemide (LASIX) 20 mg, Oral, Daily    naproxen sodium (ANAPROX) 220 mg, Oral, 2 times daily with meals, Aleve    rosuvastatin (CRESTOR) 20 mg, Oral, Daily    vitamin D (VITAMIN D3) 5,000 Units, Oral, Daily       Review of patient's allergies indicates:   Allergen Reactions    Adhesive         Patient Care Team:  Sebastien Rothman  Claribel Rao MD as PCP - General (Family Medicine)  Jesus Meadows MD as Consulting Physician (Gastroenterology)  Valentino, Vernon A., MD as Consulting Physician (Cardiology)  Olu Kuhn MD as Consulting Physician (Otolaryngology)     Subjective:     Review of Systems   Constitutional:  Negative for activity change, appetite change, fever and unexpected weight change.   HENT:  Negative for congestion, rhinorrhea and sore throat.    Eyes:  Negative for visual disturbance.   Respiratory:  Negative for cough, chest tightness and shortness of breath.    Cardiovascular:  Negative for chest pain, palpitations and leg swelling.   Gastrointestinal:  Negative for abdominal pain, blood in stool, nausea and vomiting.   Genitourinary:  Negative for difficulty urinating.   Musculoskeletal:  Negative for arthralgias.   Skin:  Negative for rash.   Neurological:  Negative for dizziness, speech difficulty, weakness, light-headedness and numbness.   Psychiatric/Behavioral:  Negative for behavioral problems, confusion, dysphoric mood, self-injury, sleep disturbance and suicidal ideas.        12 point review of systems conducted, negative except as stated in the history of present illness. See HPI for details.    Objective:     Visit Vitals  /68   Pulse 90   Temp 97.4 °F (36.3 °C)   Resp (!) 22   Ht 5' (1.524 m)   Wt 94.1 kg (207 lb 6.4 oz)   SpO2 (!) 91%   BMI 40.51 kg/m²       Physical Exam  Constitutional:       General: She is not in acute distress.     Appearance: Normal appearance. She is not ill-appearing.   HENT:      Head: Normocephalic and atraumatic.      Right Ear: Tympanic membrane, ear canal and external ear normal. There is no impacted cerumen.      Left Ear: Tympanic membrane, ear canal and external ear normal. There is no impacted cerumen.      Nose: No congestion.      Mouth/Throat:      Pharynx: Oropharynx is clear. No oropharyngeal exudate or posterior oropharyngeal erythema.   Eyes:      General:          Right eye: No discharge.         Left eye: No discharge.      Extraocular Movements: Extraocular movements intact.      Conjunctiva/sclera: Conjunctivae normal.   Cardiovascular:      Rate and Rhythm: Normal rate and regular rhythm.   Pulmonary:      Effort: Pulmonary effort is normal. No respiratory distress.      Breath sounds: Normal breath sounds.   Musculoskeletal:      Right lower leg: No edema.      Left lower leg: No edema.   Skin:     Capillary Refill: Capillary refill takes less than 2 seconds.   Neurological:      Mental Status: She is alert and oriented to person, place, and time. Mental status is at baseline.   Psychiatric:         Mood and Affect: Mood normal.         Behavior: Behavior normal.         Thought Content: Thought content normal.         Judgment: Judgment normal.         Labs Reviewed:     Chemistry:  Lab Results   Component Value Date     05/07/2024    K 4.0 05/07/2024    BUN 15.3 05/07/2024    CREATININE 0.78 05/07/2024    EGFRNORACEVR >60 05/07/2024    GLUCOSE 95 05/07/2024    CALCIUM 9.6 05/07/2024    ALKPHOS 69 05/07/2024    LABPROT 6.9 05/07/2024    ALBUMIN 3.7 05/07/2024    BILIDIR 0.3 01/30/2021    IBILI 0.10 01/30/2021    AST 15 05/07/2024    ALT 10 05/07/2024    MG 2.30 01/30/2021    [id number] 67.0 05/07/2024    TSH 2.924 05/07/2024        Lab Results   Component Value Date    HGBA1C 5.6 05/07/2024        Hematology:  Lab Results   Component Value Date    WBC 8.03 05/07/2024    HGB 15.4 05/07/2024    HCT 49.3 (H) 05/07/2024     05/07/2024       Lipid Panel:  Lab Results   Component Value Date    CHOL 129 05/07/2024    HDL 52 05/07/2024    LDL 57.00 05/07/2024    TRIG 99 05/07/2024    TOTALCHOLEST 2 05/07/2024        Urine:  Lab Results   Component Value Date    APPEARANCEUA TURBID 01/25/2021    PROTEINUA 2+ (A) 01/25/2021    LEUKOCYTESUR Trace (A) 01/25/2021    RBCUA None Seen 01/25/2021    WBCUA 6 (H) 01/25/2021    BACTERIA 1+ (A) 01/25/2021    HYALINECASTS  Occasional (A) 01/25/2021        Assessment:       ICD-10-CM ICD-9-CM   1. Valvular heart disease  I38 424.90        Plan:     1. Valvular heart disease  Overview:  Aortic stenosis  Mitral valve stenosis  Managed by cardiology - Dr. Valentino  Deemed nonsurgical candidate - Dr. Wray        Assessment & Plan:  Discussed prognosis and progressive nature of the disease  Discussed DNR and advanced planning today  -patient has all the forms at home but has not filled them out yet  -patient wants to be DNR, but her son, an EMT, disagrees with her; she will have a talk with him when the time is right.  We discussed that this was her choice, not his  -she wants her son to make decisions for her should she become incapacitated, but again she has not filled out a healthcare power-of- (or an advance directive) yet; she believes he would try to do more for her then she would want so she will have a conversation with him to make sure they are on the same page before she signs the paperwork.  As it stands today, she does not want any artificial life saving or sustaining measures, and she does not want CPR.  She wants to be allowed to go peacefully when it is time.        We also discussed that she has decreased breath sounds on the right today during exam.  However she declines any workup and specifically declines x-ray.  We discussed that if she has any substantial worsening of shortness for breath, or certainly any fevers, she will return for further evaluation.  We discussed that with aortic stenosis, there is an increased risks of fluid in the lungs, which increase the risks of pneumonia and empyema.  We discussed both of these in the symptoms and signs watch out for, and she voiced understanding.  We will see her back in 6 months      No follow-ups on file. In addition to their scheduled follow up, the patient has also been instructed to follow up on as needed basis.     Future Appointments   Date Time Provider  Department Center   1/22/2025  8:00 AM [facility]   1/27/2025  9:30 AM Sebastien Rothman Sr., MD [facility] STANISLAV Sheth MD

## 2024-07-19 DIAGNOSIS — I10 ESSENTIAL HYPERTENSION, BENIGN: Primary | ICD-10-CM

## 2024-07-19 RX ORDER — AMLODIPINE BESYLATE 10 MG/1
10 TABLET ORAL DAILY
Qty: 90 TABLET | Refills: 1 | Status: SHIPPED | OUTPATIENT
Start: 2024-07-19

## 2024-07-22 ENCOUNTER — OFFICE VISIT (OUTPATIENT)
Dept: FAMILY MEDICINE | Facility: CLINIC | Age: 84
End: 2024-07-22
Payer: MEDICARE

## 2024-07-22 VITALS
RESPIRATION RATE: 22 BRPM | WEIGHT: 207.38 LBS | TEMPERATURE: 97 F | SYSTOLIC BLOOD PRESSURE: 138 MMHG | HEIGHT: 60 IN | OXYGEN SATURATION: 91 % | BODY MASS INDEX: 40.72 KG/M2 | DIASTOLIC BLOOD PRESSURE: 68 MMHG | HEART RATE: 90 BPM

## 2024-07-22 DIAGNOSIS — I38 VALVULAR HEART DISEASE: Primary | ICD-10-CM

## 2024-07-22 PROCEDURE — 99213 OFFICE O/P EST LOW 20 MIN: CPT | Mod: ,,, | Performed by: FAMILY MEDICINE

## 2025-01-06 DIAGNOSIS — I10 ESSENTIAL HYPERTENSION, BENIGN: ICD-10-CM

## 2025-01-06 RX ORDER — BENAZEPRIL HYDROCHLORIDE 10 MG/1
10 TABLET ORAL 2 TIMES DAILY
Qty: 180 TABLET | Refills: 1 | Status: SHIPPED | OUTPATIENT
Start: 2025-01-06

## 2025-01-27 PROCEDURE — 84443 ASSAY THYROID STIM HORMONE: CPT | Performed by: FAMILY MEDICINE

## 2025-01-27 PROCEDURE — 80061 LIPID PANEL: CPT | Performed by: FAMILY MEDICINE

## 2025-01-27 PROCEDURE — 82306 VITAMIN D 25 HYDROXY: CPT | Performed by: FAMILY MEDICINE

## 2025-01-27 PROCEDURE — 83036 HEMOGLOBIN GLYCOSYLATED A1C: CPT | Performed by: FAMILY MEDICINE

## 2025-01-27 PROCEDURE — 85025 COMPLETE CBC W/AUTO DIFF WBC: CPT | Performed by: FAMILY MEDICINE

## 2025-01-27 PROCEDURE — 80053 COMPREHEN METABOLIC PANEL: CPT | Performed by: FAMILY MEDICINE

## 2025-01-28 ENCOUNTER — OFFICE VISIT (OUTPATIENT)
Dept: FAMILY MEDICINE | Facility: CLINIC | Age: 85
End: 2025-01-28
Payer: MEDICARE

## 2025-01-28 VITALS
TEMPERATURE: 98 F | BODY MASS INDEX: 40.96 KG/M2 | RESPIRATION RATE: 18 BRPM | HEIGHT: 60 IN | HEART RATE: 81 BPM | DIASTOLIC BLOOD PRESSURE: 68 MMHG | OXYGEN SATURATION: 92 % | WEIGHT: 208.63 LBS | SYSTOLIC BLOOD PRESSURE: 115 MMHG

## 2025-01-28 DIAGNOSIS — D75.1 POLYCYTHEMIA: Primary | ICD-10-CM

## 2025-01-28 DIAGNOSIS — E55.9 VITAMIN D DEFICIENCY: ICD-10-CM

## 2025-01-28 DIAGNOSIS — F41.1 GENERALIZED ANXIETY DISORDER: ICD-10-CM

## 2025-01-28 DIAGNOSIS — I10 ESSENTIAL HYPERTENSION, BENIGN: ICD-10-CM

## 2025-01-28 DIAGNOSIS — E78.2 MIXED HYPERLIPIDEMIA: ICD-10-CM

## 2025-01-28 DIAGNOSIS — R73.03 PREDIABETES: ICD-10-CM

## 2025-01-28 PROCEDURE — 99213 OFFICE O/P EST LOW 20 MIN: CPT | Mod: ,,,

## 2025-01-28 NOTE — PROGRESS NOTES
[facility]  Bernard PittmanANTONIA    Patient ID: [id number]     Chief Complaint: Follow-up (Lab results )      HPI:     Tammy Arguelles is a 84 y.o. female here today for lab results today. Lab results reviewed.  Pt reports doing well overall; denies any current complaints.    Past Medical History:   Diagnosis Date    Aortic stenosis, severe 02/06/2024    Dr Valentino    Carotid arterial disease     Depression     Essential hypertension, benign     History of rectal polyps     Hyperlipidemia     Hyperuricemia     Nontoxic single thyroid nodule     Obese     Polycythemia     Primary generalized (osteo)arthritis     Vitamin D deficiency         Past Surgical History:   Procedure Laterality Date    ANGIOGRAM, CORONARY, WITH LEFT HEART CATHETERIZATION      CHOLECYSTECTOMY      COLONOSCOPY  06/28/2023    No further screenings    HYSTERECTOMY      PARTIAL KNEE REPLACE Bilateral     R  ELBOW SURGERY      ROTATOR CUFF REPAIR Right     THYROID NODULE BIOP      THYROID SURGERY Left 2023    Nodule removed        Social History     Tobacco Use    Smoking status: Every Day     Current packs/day: 0.25     Average packs/day: 0.3 packs/day for 15.0 years (3.8 ttl pk-yrs)     Types: Cigarettes    Smokeless tobacco: Never   Substance and Sexual Activity    Alcohol use: Yes     Comment: very seldom    Drug use: Never    Sexual activity: Not on file        Current Outpatient Medications   Medication Instructions    amLODIPine (NORVASC) 10 mg, Oral, Daily    aspirin (ECOTRIN) 81 mg, Daily    benazepriL (LOTENSIN) 10 mg, Oral, 2 times daily    calcium carbonate (OS-DARRIN) 500 mg calcium (1,250 mg) chewable tablet 1 tablet, 2 times daily    FLUoxetine 40 mg, Oral, Daily    furosemide (LASIX) 20 mg, Daily    naproxen sodium (ANAPROX) 220 mg, 2 times daily with meals    rosuvastatin (CRESTOR) 20 mg, Oral, Daily    vitamin D (VITAMIN D3) 5,000 Units, Daily       Review of patient's allergies indicates:   Allergen Reactions     Adhesive         Patient Care Team:  Sebastien Rothman Sr., MD as PCP - General (Family Medicine)  Jesus Meadows MD as Consulting Physician (Gastroenterology)  Valentino, Vernon A., MD as Consulting Physician (Cardiology)  Olu Kuhn MD as Consulting Physician (Otolaryngology)     Subjective:     Review of Systems    12 point review of systems conducted, negative except as stated in the history of present illness. See HPI for details.    Objective:     Visit Vitals  /68 (BP Location: Right arm, Patient Position: Sitting)   Pulse 81   Temp 97.6 °F (36.4 °C)   Resp 18   Ht 5' (1.524 m)   Wt 94.6 kg (208 lb 9.6 oz)   SpO2 (!) 92%   BMI 40.74 kg/m²       Physical Exam  Vitals and nursing note reviewed.   Constitutional:       General: She is not in acute distress.     Appearance: She is not ill-appearing.   HENT:      Head: Normocephalic and atraumatic.      Mouth/Throat:      Mouth: Mucous membranes are moist.      Pharynx: Oropharynx is clear.   Eyes:      General: No scleral icterus.     Extraocular Movements: Extraocular movements intact.      Conjunctiva/sclera: Conjunctivae normal.      Pupils: Pupils are equal, round, and reactive to light.   Neck:      Vascular: No carotid bruit.   Cardiovascular:      Rate and Rhythm: Normal rate and regular rhythm.      Heart sounds: No murmur heard.     No friction rub. No gallop.   Pulmonary:      Effort: Pulmonary effort is normal. No respiratory distress.      Breath sounds: Normal breath sounds. No wheezing, rhonchi or rales.   Abdominal:      General: Abdomen is flat. Bowel sounds are normal. There is no distension.      Palpations: Abdomen is soft. There is no mass.      Tenderness: There is no abdominal tenderness.   Musculoskeletal:         General: Normal range of motion.      Cervical back: Normal range of motion and neck supple.   Skin:     General: Skin is warm and dry.   Neurological:      General: No focal deficit present.      Mental  Status: She is alert.   Psychiatric:         Mood and Affect: Mood normal.         Labs Reviewed:     Chemistry:  Lab Results   Component Value Date     01/27/2025    K 4.1 01/27/2025    BUN 12.2 01/27/2025    CREATININE 0.76 01/27/2025    EGFRNORACEVR >60 01/27/2025    GLUCOSE 99 01/27/2025    CALCIUM 9.3 01/27/2025    ALKPHOS 70 01/27/2025    LABPROT 6.6 01/27/2025    ALBUMIN 3.6 01/27/2025    BILIDIR 0.3 01/30/2021    IBILI 0.10 01/30/2021    AST 14 01/27/2025    ALT 8 01/27/2025    MG 2.30 01/30/2021    [id number] 81 (H) 01/27/2025    TSH 2.830 01/27/2025        Lab Results   Component Value Date    HGBA1C 5.7 01/27/2025        Hematology:  Lab Results   Component Value Date    WBC 7.47 01/27/2025    HGB 15.4 01/27/2025    HCT 50.5 (H) 01/27/2025     01/27/2025       Lipid Panel:  Lab Results   Component Value Date    CHOL 143 01/27/2025    HDL 52 01/27/2025    LDL 70.00 01/27/2025    TRIG 104 01/27/2025    TOTALCHOLEST 3 01/27/2025        Urine:  Lab Results   Component Value Date    APPEARANCEUA TURBID 01/25/2021    PROTEINUA 2+ (A) 01/25/2021    LEUKOCYTESUR Trace (A) 01/25/2021    RBCUA None Seen 01/25/2021    WBCUA 6 (H) 01/25/2021    BACTERIA 1+ (A) 01/25/2021    HYALINECASTS Occasional (A) 01/25/2021        Assessment:       ICD-10-CM ICD-9-CM   1. Polycythemia  D75.1 238.4   2. Essential hypertension, benign  I10 401.1   3. Mixed hyperlipidemia  E78.2 272.2   4. Prediabetes  R73.03 790.29   5. Vitamin D deficiency  E55.9 268.9   6. Generalized anxiety disorder  F41.1 300.02        Plan:     1. Polycythemia  Overview:  Continue with Aspirin 81 mg daily.  Polycythemia possibly secondary to smoking.  Increase daily water intake.  Will continue to monitor      Assessment & Plan:  Currently Hct 50.5 today; worsening from 8 mos ago when Hct was 49.3.  Denies pruritus, rashes, abdominal pain or fullness.  Discussed risk of stroke associated with elevated Hct; and discussed referring pt to  Hematology today for further evaluation.    Pt prefers to have the current labs sent to her cardiologist (Dr. Valentino) for a second opinion regarding referral to hematology.  Will send current labs to Dr. Valentino today.  Pt reports her next scheduled appt w/Dr. Valentino is April 2025; we will request an earlier appointment to discuss her current labs.    Pt will consider referral to hematology, pending Dr. Valentino's opinion, and we will make this referral after Dr. Valentino provides his opinion regarding pt's current Hct level.    Discussed risks associated with untreated polycythemia, including MI and stroke; and, pt verbalizes understanding of these risks, and is receptive to the idea of referring her to hematology for clinical phlebotomy, yet she would like to have her cardiologist provide his second opinion regarding whether hematology referral for clinical phlebotomy is necessary for her currently elevated hematocrit.    Pt reports limited H20 intake, (1 1/2 glasses of water daily); recommend increase water intake.  Recommend continue taking Aspirin 81 mg daily.  Repeat CBC 6 mos.    Orders:  -     CBC Auto Differential; Future; Expected date: 07/28/2025    2. Essential hypertension, benign  Overview:  Hypertension Medications               amLODIPine (NORVASC) 10 MG tablet Take 10 mg by mouth once daily.    benazepriL (LOTENSIN) 10 MG tablet Take 10 mg by mouth 2 (two) times a day.    hydroCHLOROthiazide (HYDRODIURIL) 12.5 MG Tab 1 tablet once daily.        Low Sodium Diet (DASH Diet - Less than 2 grams of sodium per day).  Monitor blood pressure daily and log. Report consistent numbers greater than 140/90.  Maintain healthy weight with goal BMI <30. Exercise 30 minutes per day, 5 days per week.      Assessment & Plan:  Pt reports she is no longer taking HCTZ, but is instead taking Lasix 20 mg daily, per Dr. Valentino (cardiology).  Blood Pressure at goal and well controlled .   Pt. to continue on  current medications and management of hypertension with Dr. Valentino.    Orders:  -     Comprehensive Metabolic Panel; Future; Expected date: 07/28/2025    3. Mixed hyperlipidemia  Overview:  Hyperlipidemia Medications               pravastatin (PRAVACHOL) 40 MG tablet Take 1 tablet (40 mg total) by mouth every evening.        Stressed importance of dietary modifications. Follow a low cholesterol, low saturated fat diet with less that 200mg of cholesterol a day.  Avoid fried foods and high saturated fats (high saturated fats less than 7% of calories).  Add Flax Seed/Fish Oil supplements to diet. Increase dietary fiber.  Regular exercise can reduce LDL and raise HDL. Stressed importance of physical activity 5 times per week for 30 minutes per day.       Assessment & Plan:  LDL 70; at goal < 100.  Continue taking Crestor 20 mg daily.  Repeat Lipid 6 mos.      Orders:  -     Lipid Panel; Future; Expected date: 07/28/2025    4. Prediabetes  Overview:  Patient is on no meds for her prediabetes   Follow ADA Diet. Avoid soda, simple sweets, and limit rice/pasta/breads/starches (no more than 45-50 grams per meal).  Maintain healthy weight with goal BMI <30.  Exercise 5 times per week for 30 minutes per day.    Assessment & Plan:  Currently A1c 5.7.    Her prediabetes is well controlled with diet.   Repeat A1c and CMP 6 mos.      5. Vitamin D deficiency  Overview:  Patient instructed to continue with OTC Vitamin D3 5,000 units daily.  Will continue to monitor.    Assessment & Plan:  Currently Vitamin D level 81; above Endocrine Society guidelines for vitamin D being b/w 40-60.  Recommend taking 5000 Vit D3 once weekly.  Repeat Vitamin D 6 mos.    Orders:  -     Vitamin D; Future; Expected date: 07/28/2025    6. Generalized anxiety disorder  Overview:  Continue with Fluoxetine 40 mg daily.  Practice deep breathing or abdominal breathing exercises when anxiety occurs.  Exercise daily. Get sunlight daily.  Avoid caffeine,  alcohol and stimulants.  Practice positive phrases and repeat throughout the day, along with yoga and relaxation techniques.  Set healthy boundaries, avoid people and conversations that increase stress.  Discussed benefits of medication not becoming noticeable until up to 6 weeks from start date.   Reports any symptoms of suicidal or homicidal ideations immediately, if clinic is closed go to nearest emergency room.    Assessment & Plan:  Symptoms are well controlled.  Continue Fluoxetine 40 mg daily.           Follow up in about 6 months (around 7/28/2025) for routine follow up. In addition to their scheduled follow up, the patient has also been instructed to follow up on as needed basis.     Future Appointments   Date Time Provider Department Center   7/28/2025  9:00 AM LAB, [facility] [place]   8/4/2025  8:20 AM PROVIDER, [facility]

## 2025-01-28 NOTE — ASSESSMENT & PLAN NOTE
Symptoms are well controlled.  Continue Fluoxetine 40 mg daily.   You can access the FollowMyHealth Patient Portal offered by [facility] by registering at the following website: [email or web address]. By joining [facility]’s FollowMyHealth portal, you will also be able to view your health information using other applications (apps) compatible with our system.

## 2025-01-28 NOTE — ASSESSMENT & PLAN NOTE
Currently Hct 50.5 today; worsening from 8 mos ago when Hct was 49.3.  Denies pruritus, rashes, abdominal pain or fullness.  Discussed risk of stroke associated with elevated Hct; and discussed referring pt to Hematology today for further evaluation.    Pt prefers to have the current labs sent to her cardiologist (Dr. Valentino) for a second opinion regarding referral to hematology.  Will send current labs to Dr. Valentino today.  Pt reports her next scheduled appt w/Dr. Valentino is April 2025; we will request an earlier appointment to discuss her current labs.    Pt will consider referral to hematology, pending Dr. Valentino's opinion, and we will make this referral after Dr. Valentino provides his opinion regarding pt's current Hct level.    Discussed risks associated with untreated polycythemia, including MI and stroke; and, pt verbalizes understanding of these risks, and is receptive to the idea of referring her to hematology for clinical phlebotomy, yet she would like to have her cardiologist provide his second opinion regarding whether hematology referral for clinical phlebotomy is necessary for her currently elevated hematocrit.    Pt reports limited H20 intake, (1 1/2 glasses of water daily); recommend increase water intake.  Recommend continue taking Aspirin 81 mg daily.  Repeat CBC 6 mos.

## 2025-01-28 NOTE — ASSESSMENT & PLAN NOTE
Currently Vitamin D level 81; above Endocrine Society guidelines for vitamin D being b/w 40-60.  Recommend taking 5000 Vit D3 once weekly.  Repeat Vitamin D 6 mos.

## 2025-01-28 NOTE — ASSESSMENT & PLAN NOTE
Pt reports she is no longer taking HCTZ, but is instead taking Lasix 20 mg daily, per Dr. Valentino (cardiology).  Blood Pressure at goal and well controlled .   Pt. to continue on current medications and management of hypertension with Dr. Valentino.

## 2025-01-29 ENCOUNTER — TELEPHONE (OUTPATIENT)
Dept: FAMILY MEDICINE | Facility: CLINIC | Age: 85
End: 2025-01-29
Payer: MEDICARE

## 2025-01-29 NOTE — TELEPHONE ENCOUNTER
Scheduled patient's dexa scan at [facility] for feb 12th at 9am  LVM  letting patient know                       Spoke with patient and she asked that I cancel it    [facility] Counseling:  I discussed with the patient the risks of imiquimod including but not limited to erythema, scaling, itching, weeping, crusting, and pain.  Patient understands that the inflammatory response to imiquimod is variable from person to person and was educated regarded proper titration schedule.  If flu-like symptoms develop, patient knows to discontinue the medication and contact us.

## 2025-02-13 ENCOUNTER — TELEPHONE (OUTPATIENT)
Dept: FAMILY MEDICINE | Facility: CLINIC | Age: 85
End: 2025-02-13
Payer: MEDICARE

## 2025-02-13 DIAGNOSIS — F32.A DEPRESSION, UNSPECIFIED DEPRESSION TYPE: ICD-10-CM

## 2025-02-13 DIAGNOSIS — I10 ESSENTIAL HYPERTENSION, BENIGN: ICD-10-CM

## 2025-02-13 RX ORDER — FLUOXETINE HYDROCHLORIDE 40 MG/1
40 CAPSULE ORAL DAILY
Qty: 90 CAPSULE | Refills: 1 | Status: SHIPPED | OUTPATIENT
Start: 2025-02-13

## 2025-02-13 RX ORDER — AMLODIPINE BESYLATE 10 MG/1
10 TABLET ORAL DAILY
Qty: 90 TABLET | Refills: 1 | Status: SHIPPED | OUTPATIENT
Start: 2025-02-13

## 2025-02-20 DIAGNOSIS — D75.1 POLYCYTHEMIA: Primary | ICD-10-CM

## 2025-03-11 DIAGNOSIS — E78.2 MIXED HYPERLIPIDEMIA: ICD-10-CM

## 2025-03-11 RX ORDER — ROSUVASTATIN CALCIUM 20 MG/1
20 TABLET, COATED ORAL DAILY
Qty: 90 TABLET | Refills: 3 | Status: SHIPPED | OUTPATIENT
Start: 2025-03-11 | End: 2026-03-11

## 2025-05-28 DIAGNOSIS — I10 ESSENTIAL HYPERTENSION, BENIGN: ICD-10-CM

## 2025-05-28 RX ORDER — BENAZEPRIL HYDROCHLORIDE 10 MG/1
10 TABLET ORAL 2 TIMES DAILY
Qty: 180 TABLET | Refills: 1 | Status: SHIPPED | OUTPATIENT
Start: 2025-05-28